# Patient Record
(demographics unavailable — no encounter records)

---

## 2025-02-27 NOTE — HISTORY OF PRESENT ILLNESS
[Hearing Loss] : hearing loss [de-identified] : 75-year-old patient presents for initial evaluation for hearing loss, hearing test. States that he is here today for ear checkup. Denies pain or discomfort. Uses [employer] to clean his ears. 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  [Ear Fullness] : no ear fullness [Tinnitus] : no tinnitus [Vertigo] : no vertigo